# Patient Record
Sex: FEMALE | Race: WHITE | Employment: PART TIME | URBAN - METROPOLITAN AREA
[De-identification: names, ages, dates, MRNs, and addresses within clinical notes are randomized per-mention and may not be internally consistent; named-entity substitution may affect disease eponyms.]

---

## 2017-12-06 ENCOUNTER — HOSPITAL ENCOUNTER (OUTPATIENT)
Dept: WOMENS IMAGING | Age: 52
Discharge: HOME | End: 2017-12-06
Attending: FAMILY MEDICINE | Admitting: FAMILY MEDICINE

## 2017-12-06 DIAGNOSIS — I65.29 OCCLUSION AND STENOSIS OF UNSPECIFIED CAROTID ARTERY: ICD-10-CM

## 2017-12-06 DIAGNOSIS — Z12.31 VISIT FOR SCREENING MAMMOGRAM: ICD-10-CM

## 2017-12-06 DIAGNOSIS — I65.29 STENOSIS OF CAROTID ARTERY, UNSPECIFIED LATERALITY: ICD-10-CM

## 2018-12-28 ENCOUNTER — HOSPITAL ENCOUNTER (OUTPATIENT)
Dept: ULTRASOUND IMAGING | Age: 53
Discharge: HOME | End: 2018-12-28
Payer: COMMERCIAL

## 2018-12-28 DIAGNOSIS — I65.29 STENOSIS OF CAROTID ARTERY, UNSPECIFIED LATERALITY: ICD-10-CM

## 2018-12-28 PROCEDURE — 93880 EXTRACRANIAL BILAT STUDY: CPT

## 2025-05-30 ENCOUNTER — OFFICE VISIT (OUTPATIENT)
Age: 60
End: 2025-05-30
Payer: COMMERCIAL

## 2025-05-30 VITALS
WEIGHT: 175 LBS | BODY MASS INDEX: 29.12 KG/M2 | DIASTOLIC BLOOD PRESSURE: 68 MMHG | SYSTOLIC BLOOD PRESSURE: 110 MMHG | OXYGEN SATURATION: 98 % | HEART RATE: 56 BPM

## 2025-05-30 DIAGNOSIS — G31.84 MILD COGNITIVE IMPAIRMENT: Primary | ICD-10-CM

## 2025-05-30 DIAGNOSIS — F43.81 PROLONGED GRIEF DISORDER: ICD-10-CM

## 2025-05-30 PROCEDURE — 99205 OFFICE O/P NEW HI 60 MIN: CPT | Performed by: PSYCHIATRY & NEUROLOGY

## 2025-05-30 RX ORDER — IBUPROFEN 200 MG
200 TABLET ORAL EVERY 6 HOURS PRN
COMMUNITY

## 2025-05-30 RX ORDER — SERTRALINE HYDROCHLORIDE 100 MG/1
100 TABLET, FILM COATED ORAL DAILY
COMMUNITY
Start: 2025-05-12

## 2025-05-30 RX ORDER — MEMANTINE HYDROCHLORIDE 10 MG/1
5 TABLET ORAL DAILY
COMMUNITY
Start: 2025-05-15

## 2025-05-30 RX ORDER — ACETAMINOPHEN 500 MG
500 TABLET ORAL EVERY 6 HOURS PRN
COMMUNITY

## 2025-05-30 NOTE — PROGRESS NOTES
5/30/25    Maria Caal  1965    Chief Complaint   Patient presents with    Memory Loss     61 y/o NP referred for mild cognitive impairment, per patient states started 2 years ago (when father passed away)       History of Present Illness  Maria is a 60 y.o. right-handed female presenting today for evaluation of: Memory concerns.  She is accompanied by her .  They note that her memory has been declining since approximately 2 years ago.  This started after her father passed away where she would be asking repetitive questions and would have short-term memory issues and is now experiencing word finding she has trouble forming thoughts and sentences as difficulty with numbers and dates, but endorses that she was never good with numbers.  She has difficulty remembering sequences of numbers/dates and appointments.  Her father passed shortly before onset of symptoms with esophageal cancer.  Her mom was in [place] and Maria states that she was the last one to see her father.  She becomes extremely emotional and tearful at this point and throughout the rest of the interview.  She endorses still feeling significant depression and grief.  She tells me that she is seeing a grief counselor.  Review of her medications shows that she is currently on sertraline.  She is not seeing any psychiatrist.  Her  reports that she has been seen at the [facility] and has undergone neuropsychometric testing twice.  The first time she was diagnosed with MCI and they are aware of the results of the second testing.    She still performs her ADLs.  She has some difficulty with IADLs.  She only drives to places that she is familiar with and does not drive to unfamiliar places.  She manages her own medications and has missed a few doses of medications here there.  Her  handles the finances.  No concerns about safety reported by the .    She holds a bachelor's in childhood education, works in the [employer]

## 2025-06-13 ENCOUNTER — HOSPITAL ENCOUNTER (OUTPATIENT)
Dept: MRI IMAGING | Age: 60
Discharge: HOME | End: 2025-06-13
Attending: PSYCHIATRY & NEUROLOGY
Payer: COMMERCIAL

## 2025-06-13 DIAGNOSIS — G31.84 MILD COGNITIVE IMPAIRMENT: ICD-10-CM

## 2025-06-13 PROCEDURE — 70551 MRI BRAIN STEM W/O DYE: CPT

## 2025-09-05 ENCOUNTER — OFFICE VISIT (OUTPATIENT)
Age: 60
End: 2025-09-05
Payer: COMMERCIAL

## 2025-09-05 VITALS
SYSTOLIC BLOOD PRESSURE: 118 MMHG | DIASTOLIC BLOOD PRESSURE: 70 MMHG | OXYGEN SATURATION: 100 % | WEIGHT: 177.4 LBS | HEART RATE: 64 BPM | BODY MASS INDEX: 29.52 KG/M2

## 2025-09-05 DIAGNOSIS — G31.84 MILD COGNITIVE IMPAIRMENT: Primary | ICD-10-CM

## 2025-09-05 DIAGNOSIS — F43.81 PROLONGED GRIEF DISORDER: ICD-10-CM

## 2025-09-05 PROCEDURE — 99214 OFFICE O/P EST MOD 30 MIN: CPT | Performed by: NURSE PRACTITIONER

## 2025-09-05 RX ORDER — MEMANTINE HYDROCHLORIDE 10 MG/1
10 TABLET ORAL 2 TIMES DAILY
Qty: 60 TABLET | Refills: 5 | Status: SHIPPED | OUTPATIENT
Start: 2025-09-05

## 2025-09-05 RX ORDER — ASPIRIN 81 MG/1
81 TABLET, CHEWABLE ORAL DAILY
COMMUNITY

## 2025-09-05 RX ORDER — CITALOPRAM HYDROBROMIDE 20 MG/1
20 TABLET ORAL DAILY
COMMUNITY
Start: 2025-08-29